# Patient Record
Sex: FEMALE | Race: WHITE
[De-identification: names, ages, dates, MRNs, and addresses within clinical notes are randomized per-mention and may not be internally consistent; named-entity substitution may affect disease eponyms.]

---

## 2020-08-09 ENCOUNTER — HOSPITAL ENCOUNTER (EMERGENCY)
Dept: HOSPITAL 62 - ER | Age: 30
Discharge: HOME | End: 2020-08-09
Payer: SELF-PAY

## 2020-08-09 VITALS — DIASTOLIC BLOOD PRESSURE: 73 MMHG | SYSTOLIC BLOOD PRESSURE: 105 MMHG

## 2020-08-09 DIAGNOSIS — R11.0: ICD-10-CM

## 2020-08-09 DIAGNOSIS — R42: ICD-10-CM

## 2020-08-09 DIAGNOSIS — Z87.891: ICD-10-CM

## 2020-08-09 DIAGNOSIS — K29.71: Primary | ICD-10-CM

## 2020-08-09 DIAGNOSIS — R04.2: ICD-10-CM

## 2020-08-09 DIAGNOSIS — R07.89: ICD-10-CM

## 2020-08-09 DIAGNOSIS — R00.1: ICD-10-CM

## 2020-08-09 LAB
ADD MANUAL DIFF: NO
ALBUMIN SERPL-MCNC: 4.6 G/DL (ref 3.5–5)
ALP SERPL-CCNC: 55 U/L (ref 38–126)
ANION GAP SERPL CALC-SCNC: 8 MMOL/L (ref 5–19)
AST SERPL-CCNC: 22 U/L (ref 14–36)
BASOPHILS # BLD AUTO: 0.1 10^3/UL (ref 0–0.2)
BASOPHILS NFR BLD AUTO: 1 % (ref 0–2)
BILIRUB DIRECT SERPL-MCNC: 0 MG/DL (ref 0–0.4)
BILIRUB SERPL-MCNC: 0.3 MG/DL (ref 0.2–1.3)
BUN SERPL-MCNC: 11 MG/DL (ref 7–20)
CALCIUM: 9.4 MG/DL (ref 8.4–10.2)
CHLORIDE SERPL-SCNC: 107 MMOL/L (ref 98–107)
CO2 SERPL-SCNC: 23 MMOL/L (ref 22–30)
EOSINOPHIL # BLD AUTO: 0.1 10^3/UL (ref 0–0.6)
EOSINOPHIL NFR BLD AUTO: 1.9 % (ref 0–6)
ERYTHROCYTE [DISTWIDTH] IN BLOOD BY AUTOMATED COUNT: 16.1 % (ref 11.5–14)
GLUCOSE SERPL-MCNC: 92 MG/DL (ref 75–110)
HCT VFR BLD CALC: 36.2 % (ref 36–47)
HGB BLD-MCNC: 11.9 G/DL (ref 12–15.5)
LYMPHOCYTES # BLD AUTO: 2.3 10^3/UL (ref 0.5–4.7)
LYMPHOCYTES NFR BLD AUTO: 29.5 % (ref 13–45)
MCH RBC QN AUTO: 28.5 PG (ref 27–33.4)
MCHC RBC AUTO-ENTMCNC: 32.8 G/DL (ref 32–36)
MCV RBC AUTO: 87 FL (ref 80–97)
MONOCYTES # BLD AUTO: 0.5 10^3/UL (ref 0.1–1.4)
MONOCYTES NFR BLD AUTO: 6.2 % (ref 3–13)
NEUTROPHILS # BLD AUTO: 4.7 10^3/UL (ref 1.7–8.2)
NEUTS SEG NFR BLD AUTO: 61.4 % (ref 42–78)
PLATELET # BLD: 318 10^3/UL (ref 150–450)
POTASSIUM SERPL-SCNC: 4.3 MMOL/L (ref 3.6–5)
PROT SERPL-MCNC: 7.6 G/DL (ref 6.3–8.2)
RBC # BLD AUTO: 4.16 10^6/UL (ref 3.72–5.28)
TOTAL CELLS COUNTED % (AUTO): 100 %
WBC # BLD AUTO: 7.6 10^3/UL (ref 4–10.5)

## 2020-08-09 PROCEDURE — 84484 ASSAY OF TROPONIN QUANT: CPT

## 2020-08-09 PROCEDURE — 36415 COLL VENOUS BLD VENIPUNCTURE: CPT

## 2020-08-09 PROCEDURE — 80053 COMPREHEN METABOLIC PANEL: CPT

## 2020-08-09 PROCEDURE — 93005 ELECTROCARDIOGRAM TRACING: CPT

## 2020-08-09 PROCEDURE — 71045 X-RAY EXAM CHEST 1 VIEW: CPT

## 2020-08-09 PROCEDURE — 85025 COMPLETE CBC W/AUTO DIFF WBC: CPT

## 2020-08-09 PROCEDURE — 99285 EMERGENCY DEPT VISIT HI MDM: CPT

## 2020-08-09 PROCEDURE — 93010 ELECTROCARDIOGRAM REPORT: CPT

## 2020-08-09 NOTE — RADIOLOGY REPORT (SQ)
EXAM DESCRIPTION: 



XR CHEST 1 VIEW



COMPLETED DATE/TME:  08/09/2020 20:14



CLINICAL HISTORY: 



30 years, Female, chest pressure



EXAM DESCRIPTION: 







CLINICAL HISTORY: 



chest pressure



COMPARISON: 



None.



FINDINGS: 



Single view of the chest is submitted. 



Cardiac silhouette is normal.



No focal parenchymal or pleural disease.



 No acute bony abnormality.



 There is no significant pulmonary vascular engorgement.



IMPRESSION: 



No evidence of acute cardiopulmonary disease.

## 2020-08-09 NOTE — ER DOCUMENT REPORT
Entered by ILIANA FOSS SCRIBE  20 





Acting as scribe for:BELINDA RUSSELL IV, MD





ED General





- General


Chief Complaint: Chest Pressure


Stated Complaint: COUGHED UP BLOOD EARLIER,CHEST PAIN


Time Seen by Provider: 20 20:23


Mode of Arrival: Ambulatory


Information source: Patient


Notes: 





This 30 year old female patient presents to the ED today with complaints of 

blood tinged sputum following an episode of dull sternal chest pressure that 

occurred while at work. Patient states that she thought it was indigestion and 

she became nauseous, so she started coughing and then "choked" on the sputum. 

She reports that it tasted like blood, so she started choking even more and 

became dizzy and when she spit it out, it was pink tinged. Denies vomiting or 

diarrhea. 








- Related Data


Allergies/Adverse Reactions: 


                                        





No Known Allergies Allergy (Unverified 20 20:55)


   











Past Medical History





- General


Information source: Patient





- Social History


Smoking Status: Former Smoker


Cigarette use (# per day): No


Chew tobacco use (# tins/day): No


Smoking Education Provided: No


Frequency of alcohol use: Occasional


Drug Abuse: None


Family History: Reviewed & Not Pertinent


Patient has suicidal ideation: No


Patient has homicidal ideation: No


Past Surgical History: Reports: Hx Kidney (Renal Surgery)





Review of Systems





- Review of Systems


Constitutional: No symptoms reported


EENT: No symptoms reported


Cardiovascular: See HPI, Dizziness, Other - Chest pressure


Respiratory: See HPI, Cough, Sputum


Gastrointestinal: See HPI, Nausea.  denies: Diarrhea, Vomiting


Genitourinary: No symptoms reported


Female Genitourinary: No symptoms reported


Musculoskeletal: No symptoms reported


Skin: No symptoms reported


Hematologic/Lymphatic: No symptoms reported


Neurological/Psychological: No symptoms reported


-: Yes All other systems reviewed and negative





Physical Exam





- Vital signs


Vitals: 


                                        











Temp Pulse Resp BP Pulse Ox


 


 98.1 F   60   14   116/67   100 


 


 20 20:03  20 20:03  20 20:03  20 20:03  20 20:03











Interpretation: Normal





- General


General appearance: Appears well, Alert


In distress: None





- HEENT


Head: Normocephalic, Atraumatic


Eyes: Normal


Pupils: PERRL





- Respiratory


Respiratory status: No respiratory distress


Chest status: Nontender


Breath sounds: Normal


Chest palpation: Normal





- Cardiovascular


Rhythm: Regular


Heart sounds: Normal auscultation


Murmur: No


Friction rub: No


Gallop: None auscultated





- Abdominal


Inspection: Normal


Distension: No distension


Bowel sounds: Normal


Tenderness: Nontender - Abdomen soft


Organomegaly: No organomegaly





- Back


Back: Normal, Nontender





- Extremities


General upper extremity: Normal inspection


General lower extremity: Normal inspection





- Neurological


Neuro grossly intact: Yes


Orientation: AAOx4


[place] Coma Scale Eye Opening: Spontaneous


[place] Coma Scale Verbal: Oriented


[place] Coma Scale Motor: Obeys Commands


Low Coma Scale Total: 15





- Psychological


Associated symptoms: Normal affect, Normal mood





- Skin


Skin Temperature: Warm


Skin Moisture: Dry


Skin Color: Normal





Course





- Re-evaluation


Re-evalutation: 





20 20:57


Results of ED MSE discussed with patient.  All questions were answered prior to 

discharge.  Emergency signs and symptoms, reasons to return to the emergency 

department discussed with patient.


20 21:55


Patient states she feels much better after GI cocktail.





- Vital Signs


Vital signs: 


                                        











Temp Pulse Resp BP Pulse Ox


 


 98.1 F   60   14   116/67   100 


 


 20 20:04  20 20:03  20 20:03  20 20:03  20 20:03














- Laboratory


Result Diagrams: 


                                 20 20:23





                                 20 20:23


Laboratory results interpreted by me: 


                                        











  20





  20:23


 


Hgb  11.9 L


 


RDW  16.1 H














- Diagnostic Test


Radiology reviewed: Reports reviewed





- EKG Interpretation by Me


Additional EKG results interpreted by me: 





20 20:58


EKG obtained on 2020 at 2038 hrs. was interpreted by this MD.  Findings 

sinus bradycardia, rate 57, normal axis, P waves are present and preceding QRS 

complexes, QRS complexes appear narrow, there are no obvious patterns of ST 

segment elevation or depression present to suggest acute myocardial ischemia or 

infarction.  Impression sinus bradycardia with nonspecific ST segments.





Discharge





- Discharge


Clinical Impression: 


Gastritis


Qualifiers:


 Gastritis type: unspecified gastritis Chronicity: unspecified Gastritis 

bleeding: with bleeding Qualified Code(s): K29.71 - Gastritis, unspecified, with

bleeding





Condition: Stable


Disposition: HOME, SELF-CARE


Additional Instructions: 


Return to the Emergency Department without delay if any worse.





You can purchase Zegerid over the counter at your local drug store and use as 

directed, as needed, for gastritis symptoms.





HOME CARE INSTRUCTIONS & INFORMATION:  Thank you for choosing us for your 

medical needs. We hope you're satisfied with the care you received.  After you 

leave, you must properly care for your problem and, at the same time, observe 

its progress.  Any condition can change.  Some illnesses can change rapidly over

hours or days.  If your condition worsens, return to the Emergency Department or

see your physician promptly.





ABOUT YOUR X-RAYS AND EKG'S:   If you had an EKG or X-rays taken, they have been

read by the Emergency Physician. The X-rays and EKG's will also be read by a 

Radiologist or Cardiologist within 24 hours.  If discrepancies are noted, you 

will be notified by telephone.  Please be certain the ED has a correct telephone

number & address where you can be reached.  Also, realize that some fractures or

abnormalities do not show up on initial X-rays.  If your symptoms continue, see 

your physician.





ABOUT YOUR LABORATORY TEST:   If you had laboratory tests, the results have been

reviewed by the Emergency Physician.  Some test results (for example cultures) 

may not be available for several days.  You will be contacted if any test result

shows you need additional treatment.  Please be certain the ED has a correct 

telephone number and address where you can be reached.





ABOUT YOUR MEDICATIONS:  You will receive instructions on how to take your 

medicine on the prescription label you receive.  Additional information may be 

provided by the Pharmacy.  If you have questions afterwards, call the ED for 

clarification or further instructions.  Some prescribed medications may cause 

drowsiness.  Do not perform tasks such as driving a car or operating machinery 

without consulting your Pharmacist.  If you feel you need a refill of pain 

medication, your condition will need re-evaluation.  Please do not call for a 

refill of any medication.





ABOUT YOUR SIGNATURE:   Signature of this document acknowledges to followin. Understanding that you received emergency treatment and that you may be 

   released before al medical problems are known or treated. Please be certain  

   the ED has a correct phone number & address where you can be reached.


   2. Acknowledgement that you will arrange for follow-up care as recommended.


   3. Authorization for the Emergency Physician to provide information to your 

follow-up Physician in order to maximize your care.





AT ANY TIME, IF YOUR SYMPTOMS CHANGE SIGNIFICANTLY OR WORSEN OR YOU DEVELOP NEW 

SYMPTOMS, RETURN TO THE EMERGENCY DEPARTMENT IMMEDIATELY FOR RE-EVALUATION.





OUR GOAL IS TO PROVIDE EXCELLENT MEDICAL CARE!





WE HOPE THAT WE HAVE MET YOUR EXPECTATIONS DURING YOUR EMERGENCY DEPARTMENT 

VISIT AND THAT YOU FEEL YOU HAVE RECEIVED EXCELLENT CARE!








Gastritis





     You have an inflammation of the stomach called gastritis.  This commonly 

causes upper abdominal pain, nausea, and vomiting.  In severe cases, bleeding of

the stomach lining can occur.  Gastritis can be caused by bacteria or viruses, 

alcohol, or stomach-irritating drugs.


     Begin with sips of clear liquids.  Take increasing amounts of fluid over 

the first 24 hours.  Then start small amounts of bland foods (such as dry toast,

applesauce, mashed potato).  Gradually resume your usual diet.


     You should take antacids every two hours until the pain has subsided.  

Acid-suppressing drugs may be prescribed as well.  Avoid aspirin, caffeine, 

tobacco, and alcohol.


     If the abdominal pain worsens, or there is evidence of major bleeding in 

the stomach (such as black, tarry stool, bloody or black vomit, or 

lightheadedness), you should return immediately.  Call the doctor if you aren't 

improved in 24 to 36 hours.





Forms:  Return to Work





I personally performed the services described in the documentation, reviewed and

edited the documentation which was dictated to the scribe in my presence, and it

accurately records my words and actions.

## 2020-08-10 NOTE — EKG REPORT
SEVERITY:- OTHERWISE NORMAL ECG -

SINUS ARRHYTHMIA, RATE [id number]

:

Confirmed by: Alfred Mcduffie MD 10-Aug-2020 06:34:30